# Patient Record
Sex: FEMALE | Race: WHITE
[De-identification: names, ages, dates, MRNs, and addresses within clinical notes are randomized per-mention and may not be internally consistent; named-entity substitution may affect disease eponyms.]

---

## 2017-09-28 ENCOUNTER — HOSPITAL ENCOUNTER (OUTPATIENT)
Dept: HOSPITAL 76 - LAB.F | Age: 42
Discharge: HOME | End: 2017-09-28
Attending: INTERNAL MEDICINE
Payer: COMMERCIAL

## 2017-09-28 DIAGNOSIS — I10: Primary | ICD-10-CM

## 2017-09-28 LAB
ANION GAP SERPL CALCULATED.4IONS-SCNC: 7 MMOL/L (ref 6–13)
BUN SERPL-MCNC: 16 MG/DL (ref 6–20)
CALCIUM UR-MCNC: 9.8 MG/DL (ref 8.5–10.3)
CHLORIDE SERPL-SCNC: 102 MMOL/L (ref 101–111)
CO2 SERPL-SCNC: 30 MMOL/L (ref 21–32)
CREAT SERPLBLD-SCNC: 0.8 MG/DL (ref 0.4–1)
GFRSERPLBLD MDRD-ARVRAT: 79 ML/MIN/{1.73_M2} (ref 89–?)
GLUCOSE SERPL-MCNC: 85 MG/DL (ref 70–100)
POTASSIUM SERPL-SCNC: 3.9 MMOL/L (ref 3.5–5)
SODIUM SERPLBLD-SCNC: 139 MMOL/L (ref 135–145)

## 2017-09-28 PROCEDURE — 36415 COLL VENOUS BLD VENIPUNCTURE: CPT

## 2017-09-28 PROCEDURE — 80048 BASIC METABOLIC PNL TOTAL CA: CPT

## 2021-12-07 ENCOUNTER — HOSPITAL ENCOUNTER (EMERGENCY)
Dept: HOSPITAL 76 - ED | Age: 46
Discharge: HOME | End: 2021-12-07
Payer: COMMERCIAL

## 2021-12-07 VITALS — SYSTOLIC BLOOD PRESSURE: 138 MMHG | DIASTOLIC BLOOD PRESSURE: 94 MMHG

## 2021-12-07 DIAGNOSIS — N83.201: ICD-10-CM

## 2021-12-07 DIAGNOSIS — K57.32: Primary | ICD-10-CM

## 2021-12-07 LAB
ALBUMIN DIAFP-MCNC: 4.6 G/DL (ref 3.2–5.5)
ALBUMIN/GLOB SERPL: 1.4 {RATIO} (ref 1–2.2)
ALP SERPL-CCNC: 66 IU/L (ref 42–121)
ALT SERPL W P-5'-P-CCNC: 60 IU/L (ref 10–60)
ANION GAP SERPL CALCULATED.4IONS-SCNC: 13 MMOL/L (ref 6–13)
AST SERPL W P-5'-P-CCNC: 51 IU/L (ref 10–42)
BASOPHILS NFR BLD AUTO: 0.1 10^3/UL (ref 0–0.1)
BASOPHILS NFR BLD AUTO: 0.3 %
BILIRUB BLD-MCNC: 1.5 MG/DL (ref 0.2–1)
BUN SERPL-MCNC: 11 MG/DL (ref 6–20)
CALCIUM UR-MCNC: 9.2 MG/DL (ref 8.5–10.3)
CHLORIDE SERPL-SCNC: 96 MMOL/L (ref 101–111)
CLARITY UR REFRACT.AUTO: CLEAR
CO2 SERPL-SCNC: 26 MMOL/L (ref 21–32)
CREAT SERPLBLD-SCNC: 0.8 MG/DL (ref 0.4–1)
EOSINOPHIL # BLD AUTO: 0 10^3/UL (ref 0–0.7)
EOSINOPHIL NFR BLD AUTO: 0 %
ERYTHROCYTE [DISTWIDTH] IN BLOOD BY AUTOMATED COUNT: 12.1 % (ref 12–15)
GFRSERPLBLD MDRD-ARVRAT: 78 ML/MIN/{1.73_M2} (ref 89–?)
GLOBULIN SER-MCNC: 3.3 G/DL (ref 2.1–4.2)
GLUCOSE SERPL-MCNC: 96 MG/DL (ref 70–100)
GLUCOSE UR QL STRIP.AUTO: NEGATIVE MG/DL
HCT VFR BLD AUTO: 41.3 % (ref 37–47)
HGB UR QL STRIP: 14 G/DL (ref 12–16)
KETONES UR QL STRIP.AUTO: 40 MG/DL
LIPASE SERPL-CCNC: 24 U/L (ref 22–51)
LYMPHOCYTES # SPEC AUTO: 1.5 10^3/UL (ref 1.5–3.5)
LYMPHOCYTES NFR BLD AUTO: 8.8 %
MCH RBC QN AUTO: 29.4 PG (ref 27–31)
MCHC RBC AUTO-ENTMCNC: 33.9 G/DL (ref 32–36)
MCV RBC AUTO: 86.6 FL (ref 81–99)
MONOCYTES # BLD AUTO: 1 10^3/UL (ref 0–1)
MONOCYTES NFR BLD AUTO: 5.7 %
NEUTROPHILS # BLD AUTO: 14.9 10^3/UL (ref 1.5–6.6)
NEUTROPHILS # SNV AUTO: 17.6 X10^3/UL (ref 4.8–10.8)
NEUTROPHILS NFR BLD AUTO: 84.6 %
NITRITE UR QL STRIP.AUTO: NEGATIVE
NRBC # BLD AUTO: 0 /100WBC
NRBC # BLD AUTO: 0 X10^3/UL
PDW BLD AUTO: 10.1 FL (ref 7.9–10.8)
PH UR STRIP.AUTO: 7 PH (ref 5–7.5)
PLATELET # BLD: 235 10^3/UL (ref 130–450)
POTASSIUM SERPL-SCNC: 3.5 MMOL/L (ref 3.5–5)
PROT SPEC-MCNC: 7.9 G/DL (ref 6.7–8.2)
PROT UR STRIP.AUTO-MCNC: NEGATIVE MG/DL
RBC # UR STRIP.AUTO: (no result) /UL
RBC MAR: 4.77 10^6/UL (ref 4.2–5.4)
SODIUM SERPLBLD-SCNC: 135 MMOL/L (ref 135–145)
SP GR UR STRIP.AUTO: <=1.005 (ref 1–1.03)
UROBILINOGEN UR QL STRIP.AUTO: (no result) E.U./DL
UROBILINOGEN UR STRIP.AUTO-MCNC: NEGATIVE MG/DL

## 2021-12-07 PROCEDURE — 81001 URINALYSIS AUTO W/SCOPE: CPT

## 2021-12-07 PROCEDURE — 83690 ASSAY OF LIPASE: CPT

## 2021-12-07 PROCEDURE — 99284 EMERGENCY DEPT VISIT MOD MDM: CPT

## 2021-12-07 PROCEDURE — 85025 COMPLETE CBC W/AUTO DIFF WBC: CPT

## 2021-12-07 PROCEDURE — 74177 CT ABD & PELVIS W/CONTRAST: CPT

## 2021-12-07 PROCEDURE — 87086 URINE CULTURE/COLONY COUNT: CPT

## 2021-12-07 PROCEDURE — 80053 COMPREHEN METABOLIC PANEL: CPT

## 2021-12-07 PROCEDURE — 81003 URINALYSIS AUTO W/O SCOPE: CPT

## 2021-12-07 PROCEDURE — 96374 THER/PROPH/DIAG INJ IV PUSH: CPT

## 2021-12-07 PROCEDURE — 96375 TX/PRO/DX INJ NEW DRUG ADDON: CPT

## 2021-12-07 PROCEDURE — 36415 COLL VENOUS BLD VENIPUNCTURE: CPT

## 2021-12-07 NOTE — CT REPORT
PROCEDURE:  Abdomen/Pelvis W

 

INDICATIONS:  RLQ Abdominal pain, appendicitis suspected

 

CONTRAST:  IV CONTRAST: Optiray 320 ml: 100 PO CONTRAST: *NO PO CONTRAST 

 

TECHNIQUE:  

After the administration of intravenous contrast, 5 mm thick sections acquired from the diaphragms to
 the symphysis.  5 mm thick coronal and sagittal reformats were acquired.  For radiation dose reducti
on, the following was used:  automated exposure control, adjustment of mA and/or kV according to montrell
ent size.  

 

COMPARISON:  None.

 

FINDINGS:  

Image quality:  Excellent.  

 

ABDOMEN:  

Lung bases: There is minimal dependent atelectasis. Heart size is normal.  

 

Solid organs: There is mild focal fatty infiltration in the anterior left hepatic lobe. Gallbladder a
ppears within normal limits without calcified gallstones. Biliary system is non dilated.  The spleen 
is normal in size. Pancreas enhances normally without peripancreatic fat stranding or fluid collectio
ns.  No adrenal nodules.  Kidneys demonstrate no hydronephrosis. 

 

Peritoneum and bowel: Stomach and small bowel loops demonstrate normal wall thickness and caliber. Th
e appendix is normal in appearance. There is colonic diverticulosis with associated diverticular and 
segmental colonic wall thickening in the sigmoid colon as well as extensive pericolonic fat stranding
 consistent with acute diverticulitis. No discrete diverticular abscess or macroscopic free air.

 

Nodes and vessels:  No retroperitoneal or mesenteric adenopathy by size criteria.  Aorta and inferior
 vena cava are normal in size.  

 

Miscellaneous:  No ventral hernias.  

 

 

PELVIS:  

Genitourinary:  Bladder wall thickness is normal. An IUD appears in appropriate position in the luis
l endometrium. There is a right adnexal thin-walled cyst measuring up to 2.5 x 2.4 cm. 

 

Miscellaneous:  No inguinal hernias or adenopathy.  

 

Bones:  No suspicious bony lesions.  No vertebral body compression fractures.  

 

IMPRESSION:  

 

1. Sigmoid diverticulitis without evidence of diverticular abscess or macroscopic free air.

 

2. Given the degree of colonic wall thickening, consider a follow-up CT or colonoscopy to exclude an 
underlying mass.

 

3. No evidence of appendicitis.

 

3. Right adnexal cyst measuring up to 2.5 cm likely represents a follicular cyst.

 

Reviewed by: Roger Johnson MD on 12/7/2021 9:55 AM [facility]

Approved by: Roger Johnson MD on 12/7/2021 9:55 AM [facility]

 

 

Station ID:  [id number]

## 2021-12-07 NOTE — ED PHYSICIAN DOCUMENTATION
PD HPI ABD PAIN





- Stated complaint


Stated Complaint: ABDOMINAL PX





- Chief complaint


Chief Complaint: Abd Pain





- History obtained from


History obtained from: Patient





- History of Present Illness


Timing - onset: How many days ago (2)


Timing - duration: Days (2)


Timing - details: Gradual onset, Still present


Quality: Cramping, Aching, Pain


Location: RLQ, Suprapubic


Radiation: Lower back


Improved by: Laying still.  No: Eating


Worsened by: Moving, Palpation.  No: Eating, Breathing


Associated symptoms: Nausea.  No: Fever, Vomiting, Diarrhea, Constipation, 

Dysuria (but does have feeling of bladder fullness.)


Similar symptoms before: Has not had sx before


Recently seen: Clinic (went to walk in clinic today and referred to ER. Had 

normal UA dip test there.)





Review of Systems


Constitutional: reports: Fever (this morning).  denies: Chills


Nose: denies: Rhinorrhea / runny nose, Congestion


Throat: denies: Sore throat


Cardiac: denies: Chest pain / pressure


Respiratory: denies: Cough


GI: reports: Abdominal Pain, Nausea.  denies: Vomiting, Constipation, Diarrhea, 

Bloody / black stool


: reports: Hesitancy.  denies: Dysuria, Frequency, Discharge, Vaginal bleeding


Skin: denies: Rash


Musculoskeletal: denies: Back pain





PD PAST MEDICAL HISTORY





- Past Medical History


Cardiovascular: None


Respiratory: None


Neuro: None


Endocrine/Autoimmune: None


GI: None





- Present Medications


Home Medications: 


                                Ambulatory Orders











 Medication  Instructions  Recorded  Confirmed


 


Docusate Sodium 100Mg Capsule 100 mg PO DAILY #10 cap 12/07/21 





[Colace 100Mg Capsule]   


 


HYDROcod/ACETAM 5/325 [Norco 5/325] 1 ea PO Q6H PRN #14 tablet 12/07/21 


 


Naproxen 250 mg PO TID 7 Days #20 tablet 12/07/21 


 


cephALEXin [Keflex] 500 mg PO TID 5 Days #15 cap 12/07/21 


 


hydroCHLOROthiazide [Hydrodiuril] 12.5 mg PO DAILY 12/07/21 12/07/21


 


metroNIDAZOLE [Flagyl] 250 mg PO TID 5 Days #15 tablet 12/07/21 














- Allergies


Allergies/Adverse Reactions: 


                                    Allergies











Allergy/AdvReac Type Severity Reaction Status Date / Time


 


amoxicillin Allergy  Rash Verified 12/07/21 10:00














- Living Situation


Living Situation: reports: With spouse/s.o.


Living Arrangement: reports: At home





- Social History


Does the pt smoke?: No


Does the pt drink ETOH?: No


Does the pt have substance abuse?: No





PD ED PE NORMAL





- Vitals


Vital signs reviewed: Yes





- General


General: Alert and oriented X 3, Well developed/nourished





- HEENT


HEENT: Pharynx benign





- Neck


Neck: Supple, no meningeal sign, No adenopathy





- Cardiac


Cardiac: RRR, No murmur





- Respiratory


Respiratory: Clear bilaterally





- Abdomen


Abdomen: Normal bowel sounds, Soft, Non distended, No organomegaly, Other 

(tender lower abd suprapubic, with right worse than left. )





- Female 


Female : Deferred





- Rectal


Rectal: Deferred





- Derm


Derm: Normal color, Warm and dry





- Extremities


Extremities: No edema, No calf tenderness / cord





- Neuro


Neuro: Alert and oriented X 3, No motor deficit, Normal speech





Results





- Vitals


Vitals: 


                               Vital Signs - 24 hr











  12/07/21 12/07/21 12/07/21





  09:29 09:58 11:12


 


Temperature 37.9 C 38.4 C H 37.6 C


 


Heart Rate 119 H 107 H 101 H


 


Respiratory 16 12 14





Rate   


 


Blood Pressure 153/92 H 145/107 H 141/91 H


 


O2 Saturation 99 99 100














  12/07/21





  12:20


 


Temperature 37.1 C


 


Heart Rate 102 H


 


Respiratory 18





Rate 


 


Blood Pressure 138/94 H


 


O2 Saturation 97








                                     Oxygen











O2 Source                      Room air

















- Labs


Labs: 


                                Laboratory Tests











  12/07/21 12/07/21 12/07/21





  09:48 09:48 11:15


 


WBC  17.6 H  


 


RBC  4.77  


 


Hgb  14.0  


 


Hct  41.3  


 


MCV  86.6  


 


MCH  29.4  


 


MCHC  33.9  


 


RDW  12.1  


 


Plt Count  235  


 


MPV  10.1  


 


Neut # (Auto)  14.9 H  


 


Lymph # (Auto)  1.5  


 


Mono # (Auto)  1.0  


 


Eos # (Auto)  0.0  


 


Baso # (Auto)  0.1  


 


Absolute Nucleated RBC  0.00  


 


Nucleated RBC %  0.0  


 


Sodium   135 


 


Potassium   3.5 


 


Chloride   96 L 


 


Carbon Dioxide   26 


 


Anion Gap   13.0 


 


BUN   11 


 


Creatinine   0.8 


 


Estimated GFR (MDRD)   78 L 


 


Glucose   96 


 


Calcium   9.2 


 


Total Bilirubin   1.5 H 


 


AST   51 H 


 


ALT   60 


 


Alkaline Phosphatase   66 


 


Total Protein   7.9 


 


Albumin   4.6 


 


Globulin   3.3 


 


Albumin/Globulin Ratio   1.4 


 


Lipase   24 


 


Urine Color    YELLOW


 


Urine Clarity    CLEAR


 


Urine pH    7.0


 


Ur Specific Gravity    <=1.005


 


Urine Protein    NEGATIVE


 


Urine Glucose (UA)    NEGATIVE


 


Urine Ketones    40 H


 


Urine Occult Blood    TRACE-LYSE


 


Urine Nitrite    NEGATIVE


 


Urine Bilirubin    NEGATIVE


 


Urine Urobilinogen    0.2 (NORMAL)


 


Ur Leukocyte Esterase    NEGATIVE


 


Ur Microscopic Review    NOT INDICATED


 


Urine Culture Comments    NOT INDICATED














- Rads (name of study)


  ** abd/pelvic CT


Radiology: Prelim report reviewed (normal appendix. SIgmoid diverticulitis with 

local inflammation, but no perforation nor abscess. Noted also 2.5 cm ovarian 

cyst. IUD in place. )





PD MEDICAL DECISION MAKING





- ED course


Complexity details: reviewed results (normal appendix; positive sigmoid 

diverticulitis without abscess nor perf. Noted 2.5 cm ovarian cyst and IUd in 

place. ), considered differential, d/w patient





Departure





- Departure


Disposition: 01 Home, Self Care


Clinical Impression: 


 Diverticulitis of gastrointestinal tract





Abdominal pain


Qualifiers:


 Abdominal location: right lower quadrant Qualified Code(s): R10.31 - Right 

lower quadrant pain





Condition: Stable


Record reviewed to determine appropriate education?: Yes


Instructions:  ED Diverticulitis


Follow-Up: 


Nain Perry MD [Primary Care Provider] - 


Prescriptions: 


Docusate Sodium 100Mg Capsule [Colace 100Mg Capsule] 100 mg PO DAILY #10 cap


metroNIDAZOLE [Flagyl] 250 mg PO TID 5 Days #15 tablet


cephALEXin [Keflex] 500 mg PO TID 5 Days #15 cap


Naproxen 250 mg PO TID 7 Days #20 tablet


HYDROcod/ACETAM 5/325 [Norco 5/325] 1 ea PO Q6H PRN #14 tablet


 PRN Reason: Pain


Comments: 


X appears normal.  There is signs of diverticulitis that we can treat with anti-

inflammatories and antibiotics.  Incidentally you do have a 2.5 cm 

(approximately 1 inch) right ovarian cyst that does not seem to be part of the 

current process.  This is typically not painful.  Your IUD is in place without 

any apparent problems.





Stay well-hydrated and regular food as tolerated.  Use naproxen 

anti-inflammatories and metronidazole/cephalexin antibiotics as prescribed over 

the next 5 to 7 days.  Use a mild stool softener so you do not get constipated. 

Add hydrocodone or Tylenol if needed for worse pains.





I would anticipate improvement over the next few days.  Return if worsening 

symptoms generally.





I transmitted your prescriptions to [facility] in [place].





My narcotic instructions I am prescribing a short course of narcotic pain 

medication for you.  These are potentially dangerous and addictive medications 

that should be used carefully.


These medications may constipate you.  Take an over-the-counter stool softener 

such as docusate twice daily with plenty of water while taking these 

medications.  If you go 24 hours without a bowel movement, take over-the-counter

MiraLAX, per package instructions.


Do not drink or drive while taking these medications.


If you received narcotic or sedating medications while in the emergency 

department do not drive for 24 hours.  Store this medication in a safe, secure 

place and out of reach of children.


It is a violation of federal law to give or sell this medication to another 

person or to use in a manner other than prescribed.


The ED will not refill narcotic prescriptions, including prescriptions lost or 

stolen.  You can dispose of unwanted medications at the [facility]'s office 

or at several pharmacies such as [facility].


Discharge Date/Time: 12/07/21 12:21